# Patient Record
Sex: FEMALE | Race: WHITE
[De-identification: names, ages, dates, MRNs, and addresses within clinical notes are randomized per-mention and may not be internally consistent; named-entity substitution may affect disease eponyms.]

---

## 2020-10-15 ENCOUNTER — HOSPITAL ENCOUNTER (EMERGENCY)
Dept: HOSPITAL 60 - LB.ED | Age: 50
Discharge: HOME | End: 2020-10-15
Payer: COMMERCIAL

## 2020-10-15 DIAGNOSIS — M54.16: Primary | ICD-10-CM

## 2020-10-15 NOTE — MR
DATE OF SERVICE:  10/15/20

CLINICAL DATA:  Low back pain with radiculopathy for 3 years



LUMBAR SPINE MRI:



Routine MR protocol.  



There is degeneration and dehydration of the L5-S1 disc with disc space 
narrowing.  There is annular bulging of the L5-S1 disc with a small central disc
protrusion that is slightly eccentric to the left.  There is mild facet joint 
hypertrophy at this level.  Minimal neural foraminal stenosis bilaterally. 



No other disc abnormalities. 



There is geographic signal abnormality within the T12, L1, and L2 vertebra with 
increased signal on the T1 and T2-weighted images consistent with focal fatty 
marrow replacement or hemangiomas.  No significant marrow signal abnormality. 



Spinal cord terminates at the L1 level. 



IMPRESSION:  Small disc protrusion at L5-S1 level as discussed above. 



[id number]

[facility]

## 2020-10-15 NOTE — EDM.PDOC
ED HPI GENERAL MEDICAL PROBLEM





- General


Chief Complaint: Back Pain or Injury


Stated Complaint: BACK PAIN


Time Seen by Provider: 10/15/20 10:50


Source of Information: Reports: Patient, RN Notes Reviewed


History Limitations: Reports: No Limitations





- History of Present Illness


INITIAL COMMENTS - FREE TEXT/NARRATIVE: 





Patient with 3 year history of intermittent back pain with diagnosis of 

Degenerative Disk Disease complains of worseing pain over the past week with 

radiation of pain down her left leg. Rates pain 9/10 making it difficulty for 

her to walk or move around without pain. Feels better in a seated position.  

States she would like to find out the cause of her pain


Location: Reports: Other (pain  down back of left leg down to the posterior kn

ee)


Quality: Reports: Other (Similar to prior episodes)


Improves with: Reports: Immobilization


Worsens with: Reports: Movement


Associated Symptoms: Denies: Fever/Chills, Nausea/Vomiting, Shortness of Breath


Treatments PTA: Reports: NSAIDS





ED ROS GENERAL





- Review of Systems


Review Of Systems: See Below





ED EXAM, NEURO





- Physical Exam


Exam: See Below





Course





- Vital Signs


Last Recorded V/S: 


                                Last Vital Signs











Temp  97.6 F   10/15/20 10:30


 


Pulse      


 


Resp  16   10/15/20 10:30


 


BP  130/80   10/15/20 10:30


 


Pulse Ox  99   10/15/20 10:30














- Orders/Labs/Meds


Meds: 


Medications














Discontinued Medications














Generic Name Dose Route Start Last Admin





  Trade Name Freq  PRN Reason Stop Dose Admin


 


Ketorolac Tromethamine  30 mg  10/15/20 11:10  10/15/20 13:42





  Toradol  IM  10/15/20 11:11  Not Given





  ONETIME ONE  


 


Ketorolac Tromethamine  Confirm  10/15/20 13:44 





  Toradol  Administered  10/15/20 13:45 





  Dose  





  30 mg  





  .ROUTE  





  .STK-MED ONE  


 


Ketorolac Tromethamine  30 mg  10/15/20 13:39  10/15/20 13:39





  Toradol  IVPUSH  10/15/20 13:40  30 mg





  ONETIME ONE   Administration


 


Lorazepam  Confirm  10/15/20 12:17 





  Ativan  Administered  10/15/20 12:18 





  Dose  





  2 mg  





  .ROUTE  





  .STK-MED ONE  


 


Lorazepam  2 mg  10/15/20 12:14  10/15/20 12:35





  Ativan  IVPUSH  10/15/20 12:15  2 mg





  ONETIME ONE   Administration














Departure





- Departure


Time of Disposition: 13:45


Disposition: Home, Self-Care 01


Condition: Good


Clinical Impression: 


 Lumbar back pain with radiculopathy affecting left lower extremity








- Discharge Information


*PRESCRIPTION DRUG MONITORING PROGRAM REVIEWED*: Not Applicable


*COPY OF PRESCRIPTION DRUG MONITORING REPORT IN PATIENT CARRIE: Not Applicable


Instructions:  Radicular Pain


Referrals: 


PCP,None [Primary Care Provider] - 


Forms:  ED Department Discharge


Additional Instructions: 


-Gradually increase activity as tolerated. Continue Ibuprofen 200 mg tablets 3 

tablets   4X/day for pain. 


Follow up in clinic  tomorrow as scheduled to consider referral to Physical 

Therapy. Flexeril 10 mg three times a day as needed for muscle spasms.


Return as needed for worseing pain or any difficulty with bowel or bladder 

function such as starting or stopping urination.


Call your Physician or Return to Emergency Department if:





   * Your condition worsens in any way.


   * You develop fever greater than 100.4.


   * You have vomitting that does not stop with medications.


   * You have pain that is not controlled with medications.











Sepsis Event Note (ED)





- Evaluation


Sepsis Screening Result: No Definite Risk